# Patient Record
Sex: MALE | Race: BLACK OR AFRICAN AMERICAN | NOT HISPANIC OR LATINO | Employment: STUDENT | URBAN - METROPOLITAN AREA
[De-identification: names, ages, dates, MRNs, and addresses within clinical notes are randomized per-mention and may not be internally consistent; named-entity substitution may affect disease eponyms.]

---

## 2020-07-01 ENCOUNTER — OFFICE VISIT (OUTPATIENT)
Dept: ORTHOPEDICS | Facility: CLINIC | Age: 20
End: 2020-07-01
Payer: MEDICAID

## 2020-07-01 VITALS — HEIGHT: 73 IN | BODY MASS INDEX: 23.36 KG/M2 | WEIGHT: 176.25 LBS

## 2020-07-01 DIAGNOSIS — S93.492A SPRAIN OF ANTERIOR TALOFIBULAR LIGAMENT OF LEFT ANKLE, INITIAL ENCOUNTER: Primary | ICD-10-CM

## 2020-07-01 PROCEDURE — 99202 OFFICE O/P NEW SF 15 MIN: CPT | Mod: PBBFAC | Performed by: NURSE PRACTITIONER

## 2020-07-01 PROCEDURE — 99999 PR PBB SHADOW E&M-NEW PATIENT-LVL II: ICD-10-PCS | Mod: PBBFAC,,, | Performed by: NURSE PRACTITIONER

## 2020-07-01 PROCEDURE — 99999 PR PBB SHADOW E&M-NEW PATIENT-LVL II: CPT | Mod: PBBFAC,,, | Performed by: NURSE PRACTITIONER

## 2020-07-01 PROCEDURE — 99203 OFFICE O/P NEW LOW 30 MIN: CPT | Mod: S$PBB,,, | Performed by: NURSE PRACTITIONER

## 2020-07-01 PROCEDURE — 99203 PR OFFICE/OUTPT VISIT, NEW, LEVL III, 30-44 MIN: ICD-10-PCS | Mod: S$PBB,,, | Performed by: NURSE PRACTITIONER

## 2020-07-01 NOTE — PROGRESS NOTES
sSubjective:      Patient ID: Irving Pandey is a 19 y.o. male.    Chief Complaint: Ankle Injury (left)    In February, 2020 patient sprained his left ankle.  It seemed to get better during the quarantine, but when he returned to football, he injured it again.  He is here for evaluation and treatment.      Review of patient's allergies indicates:  No Known Allergies    History reviewed. No pertinent past medical history.  Past Surgical History:   Procedure Laterality Date    CIRCUMCISION      WISDOM TOOTH EXTRACTION       History reviewed. No pertinent family history.    No current outpatient medications on file prior to visit.     No current facility-administered medications on file prior to visit.        Social History     Social History Narrative    Mom    1 dog    Football        Review of Systems   Constitution: Negative for chills and fever.   HENT: Negative for congestion.    Eyes: Negative for discharge.   Cardiovascular: Negative for chest pain.   Respiratory: Negative for cough.    Skin: Negative for rash.   Musculoskeletal: Positive for joint pain and joint swelling.   Gastrointestinal: Negative for abdominal pain and bowel incontinence.   Genitourinary: Negative for bladder incontinence.   Neurological: Negative for headaches, numbness and paresthesias.   Psychiatric/Behavioral: The patient is not nervous/anxious.          Objective:      General    Development well-developed   Nutrition well-nourished   Body Habitus normal weight   Mood no distress    Speech normal    Tone normal        Spine    Tone tone             Vascular Exam  Dorsalis Pectus pulse Left 2+       Upper          Wrist  Stability no Right Wrist Unstable   no Left Wrist Unstable           Lower          Ankle  Tenderness   Left AITFL   Range of Motion Dorsiflexion:   Right normal    Left normal  Plantarflexion:   Right normal    Left abnormal normal Eversion:   Right normal    Left normal  Inversion:   Right normal    Left normal     Stability no anterior drawer  no hyperpronation    no anterior drawer  no hyperpronation    Muscle Strength normal right ankle strength  normal left ankle strength    Alignment Right normal   Left normal     Swelling Right swelling normal   Left swelling   mild     Foot  Alignment none                        Extremity  Gait normal   Tone Right normal Left Normal   Skin Right normal    Left normal    Sensation Right normal  Left normal   Pulse   Left 2+                      Assessment:       1. Sprain of anterior talofibular ligament of left ankle, initial encounter           Plan:       Orders written to start PT.  Placed in a lace up ankle brace.  Return for follow up in 1 month.    Follow up in about 1 month (around 8/1/2020).

## 2020-07-13 ENCOUNTER — CLINICAL SUPPORT (OUTPATIENT)
Dept: REHABILITATION | Facility: HOSPITAL | Age: 20
End: 2020-07-13
Payer: MEDICAID

## 2020-07-13 DIAGNOSIS — R53.1 DECREASED STRENGTH: ICD-10-CM

## 2020-07-13 DIAGNOSIS — M25.672 DECREASED RANGE OF MOTION OF LEFT ANKLE: ICD-10-CM

## 2020-07-13 DIAGNOSIS — S93.492A SPRAIN OF ANTERIOR TALOFIBULAR LIGAMENT OF LEFT ANKLE, INITIAL ENCOUNTER: ICD-10-CM

## 2020-07-13 PROCEDURE — 97110 THERAPEUTIC EXERCISES: CPT | Mod: PN

## 2020-07-13 PROCEDURE — 97161 PT EVAL LOW COMPLEX 20 MIN: CPT | Mod: PN

## 2020-07-13 NOTE — PLAN OF CARE
[facility]  Physical Therapy Initial Evaluation    Name: Irving Pandey  Clinic Number: [id number]    Therapy Diagnosis:   Encounter Diagnoses   Name Primary?    Sprain of anterior talofibular ligament of left ankle, initial encounter     Decreased strength     Decreased range of motion of left ankle      Physician: Shannan Salas NP    Physician Orders: PT Eval and Treat  Medical Diagnosis from Referral:   S93.492A (ICD-10-CM) - Sprain of anterior talofibular ligament of left ankle, initial encounter  Evaluation Date: 7/13/2020  Authorization Period Expiration: 12/31/2020  Plan of Care Expiration: 9/11/2020  Visit # / Visits authorized: 1/50  FOTO: 1/5  PTA Visit: 0/6    Time In: 4:55  Time Out: 5:30  Total Billable Time: 35 minutes (1 TE + 1 LCE)    Precautions: Standard and chronic low back pain, L patella tendinitis    Subjective   Date of onset: 5 months ago  History of current condition - Irving reports: injuring his left ankle about 5 months ago playing basketball spraining his ankle when planting his left foot to go up for a lay up. He was not able to walk well until about 2-3 days after (did not place any LLE weight until then), had increased swelling, no bruising, and does not remember any popping/cracking during the injury. He took a break from sports, but was training with his  running routes for football. 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 He was having some L ankle pain, but was not until he started running routes again another player when he re-sprained his left ankle. His pain is located at the barbara-lateral aspect of his left ankle.  History of L knee tendinitis that he had PT for; this is an on and off issue. Had some knee pain late last year that eventually went away.  Denies any numbness or tingling,      Medical History:   No past medical history on file.    Surgical History:   Irving Pandey  has a past surgical history that includes  Circumcision and [place] tooth extraction.    Medications:   Irving TERAN currently has no medications in their medication list.    Allergies:   Review of patient's allergies indicates:  No Known Allergies     Imaging: no ankle x-rays    Prior Therapy: yes for left knee  Social History: 13 steps, 2 story home  Occupation: sophomore at [employer]  Prior Level of Function: no left ankle pain before initial injury this year  Current Level of Function: running/jogging, hopping, cutting  Pts goals: to remove all ankle pain    Pain:  Current 1/10, worst 9/10, best 0/10   Location: left barbara-lateral ankle aspect  Description: sharp  Aggravating Factors: see limitations above in function limitation  Easing Factors: getting off his feet    Objective     Gait: WNL, slight decreased R step length  Posture: WNL  Sensation: WNL  Palpation: +TTP at L ATFL and B SI joint, midline lumbar spine  Joint mobility: decreased L TC mobility  Flexibility: decreased L calf length  Overhead Squat: pain at base of squat    A/PROM and MMT  * = left ankle pain with testing  NT = Not tested  AROM: LUMBAR   Flexion 80%   Extension 80%   Right side bending 100%   Left side bending 100%   Right rotation 100%   Left rotation 100%     Hip  Right   Left  Pain/Dysfunction with Movement    AROM PROM MMT AROM PROM MMT    Flexion 110 115 4/5 110 115 4/5    Extension 5 8 2+/5 5 6 2+/5    Abduction WFL WFL 3+/5 WFL WFL 3+/5    Adduction WFL WFL 5/5 WFL WFL 5/5    Internal rotation WFL WFL 5/5 WFL WFL 5/5    External rotation WFL WFL 4+/5 WFL WFL 4+/5       Knee  Right   Left  Pain/Dysfunction with Movement    AROM PROM MMT AROM PROM MMT    Flexion (140 deg) WFL WFL 5/5 WFL WFL 4/5    Extension (0-5 deg) WFL WFL 5/5 WFL WFL 4/5      Ankle  Right   Left  Pain/Dysfunction with Movement    AROM PROM MMT AROM PROM MMT    Great toe (45/70 deg) -- -- 5/5 -- -- 5/5    Plantarflexion (50 deg 5/5 5/5 5/5 41* 42* 4/5    Dorsiflexion (20 deg) 5/5 5/5 5/5 -5* -3* 3+/5     Inversion (20-30 deg) 5/5 5/5 5/5 22* 23* 3+/5    Eversion (5-10 deg) 5/5 5/5 5/5 23 24 3+/5      DL heel raise assessment = decreased height on LLE with decreased WB  SL heel raise assessment = not tested on L  Anterior Drawer Test = increased laxity on L  Anterior Talofibular Ligament Stress test = positive on L  Calcaneofibular ligament stress test = positive on L mildly  Interdigital Neuroma Test = negative B  Suarez test = not tested  Talar Tilit Test = not tested  Navicular drop test = not tested   (Difference of >10 mm is considered significant excessive foot pronation.)  Tinel's Sign Test (tarsal tunnel syndrome) = negative L    CMS Impairment/Limitation/Restriction for FOTO ANKLE Survey    Therapist reviewed FOTO scores for Irving Pandey on 7/13/2020.   FOTO documents entered into [employer] - see Media section.    Limitation Score: 52%  Category: Mobility  Predicted: 29%     TREATMENT   Treatment Time In: 5:20  Treatment Time Out: 5:30  Total Treatment time separate from Evaluation: 10 minutes    Irving received therapeutic exercises to develop strength, endurance, ROM, flexibility and posture for 10 minutes including:    SLR: 10x B  SL hip abd: 10x B  Calf stretch: 1x30''  Ankle circles: 5x L  Isometric ankle eversion: 3x3'' L    Home Exercises and Patient Education Provided:  Education provided:   - proper foot wear with arch support  - course of therapy, prognosis    Written Home Exercises Provided: yes.  Exercises were reviewed and Irving was able to demonstrate them prior to the end of the session.  Irving demonstrated good  understanding of the education provided.     See EMR under Media for exercises provided 7/13/2020.    Assessment   Irving TERAN is a 19 y.o. male referred to outpatient Physical Therapy with a medical diagnosis of Sprain of anterior talofibular ligament of left ankle, initial encounter presenting to PT at [facility]. Pt currently presents with L ankle  pain, decreased lumbar ROM, decreased left ankle ROM, decreased BLE and core strength, impaired balance and gait, and functional deficits with squatting, jumping/running, and playing football. Pt would benefit from skilled PT consisting of gait training, muscular skeletal stretching/strengthening, manual therapy, neuro muscular re-education, and modalities prn to address limitations and increase functional mobility.    Pt prognosis is Excellent.   Pt will benefit from skilled outpatient Physical Therapy to address the deficits stated above and in the chart below, provide pt/family education, and to maximize pt's level of independence.     Plan of care discussed with patient: Yes  Pt's spiritual, cultural and educational needs considered and patient is agreeable to the plan of care and goals as stated below:     Anticipated Barriers for therapy: chronic low back pain    Medical Necessity is demonstrated by the following  History  Co-morbidities and personal factors that may impact the plan of care Co-morbidities:   chronic low back pain, L patella tendinitis    Personal Factors:   no deficits     moderate   Examination  Body Structures and Functions, activity limitations and participation restrictions that may impact the plan of care Body Regions:   back  lower extremities  trunk    Body Systems:    gross symmetry  ROM  strength  gross coordinated movement  balance  gait  transfers  transitions  motor control  edema    Participation Restrictions:   Football, route running practice, basketball    Activity limitations:   Learning and applying knowledge  no deficits    General Tasks and Commands  no deficits    Communication  no deficits    Mobility  running/jumping, hopping    Self care  no deficits    Domestic Life  no deficits    Interactions/Relationships  no deficits    Life Areas  no deficits    Community and Social Life  no deficits         high   Clinical Presentation stable and uncomplicated low   Decision  Making/ Complexity Score: low     GOALS: Short Term Goals:  4 weeks  1. Report decreased L ankle pain </= 0/10 in standing/walking/stairs to increase tolerance for ADLs.  2. Increase L ankle AROM dorsiflexion by 3 degrees in order to walk with min to no compensation.  3. Pt will demo good sitting and standing posture for improved spine and joint alignment for improved biomechanics.  4. Pt to tolerate HEP to improve ROM and independence with ADL's.    Long Term Goals: 8 weeks  1. Report decreased L ankle pain </= 0/10 during jumping/running to increase tolerance for increased QoL and to return back to football.  2. Patient goal: to remove all ankle pain  3. Increase strength to >/= 5/5 for BLE to increase tolerance for ADL and work activities.  4. Pt will report at </= 29% impaired on ANKLE FOTO score to demo increased functional mobility.   5. Pt will be able to ambulate community distances and negotiate stairs with minimal ankle pain for increased functional mobility and QoL.    Plan   Plan of care Certification: 7/13/2020 to 9/11/2020.    Outpatient Physical Therapy 2 times weekly for 8 weeks to include the following interventions: Aquatic Therapy, Debridement (nonselective), Debridement (selective), Electrical Stimulation IFC/Russian, Gait Training, Manual Therapy, Moist Heat/ Ice, Neuromuscular Re-ed, Orthotic Management and Training, Patient Education, Self Care, Therapeutic Activites and Therapeutic Exercise.     Tien Lvoe, PT

## 2020-07-20 ENCOUNTER — CLINICAL SUPPORT (OUTPATIENT)
Dept: REHABILITATION | Facility: HOSPITAL | Age: 20
End: 2020-07-20
Payer: MEDICAID

## 2020-07-20 DIAGNOSIS — R53.1 DECREASED STRENGTH: ICD-10-CM

## 2020-07-20 DIAGNOSIS — M25.672 DECREASED RANGE OF MOTION OF LEFT ANKLE: ICD-10-CM

## 2020-07-20 PROBLEM — M25.671 DECREASED RANGE OF MOTION OF BOTH ANKLES: Status: RESOLVED | Noted: 2020-07-20 | Resolved: 2020-07-20

## 2020-07-20 PROBLEM — M25.671 DECREASED RANGE OF MOTION OF BOTH ANKLES: Status: ACTIVE | Noted: 2020-07-20

## 2020-07-20 PROCEDURE — 97110 THERAPEUTIC EXERCISES: CPT | Mod: PN

## 2020-07-20 NOTE — PROGRESS NOTES
"  Physical Therapy Treatment Note     Name: Irving Pandey  Clinic Number: [id number]    Therapy Diagnosis:   Encounter Diagnoses   Name Primary?    Decreased strength     Decreased range of motion of left ankle      Physician: Shannan Salas NP    Visit Date: 7/20/2020    Physician Orders: PT Eval and Treat  Medical Diagnosis from Referral:   S93.492A (ICD-10-CM) - Sprain of anterior talofibular ligament of left ankle, initial encounter  Evaluation Date: 7/13/2020  Authorization Period Expiration: 12/31/2020  Plan of Care Expiration: 9/11/2020  Visit # / Visits authorized: 2/50  FOTO: 2/5  PTA Visit: 0/6    Time In: 6:00 pm  Time Out: 6:45 pm  Total Billable Time: 45 minutes (3 TE)  Precautions: Standard and chronic low back pain, L patella tendinitis    Subjective     Pt reports: his ankle has been doing alright. He's been doing the exercises Murali Love, PT provided first visit.   He was compliant with home exercise program.  Response to previous treatment: Minimal change   Functional change: Minimal change     Pain: 6/10  Location: left ankle     Objective        Irving received therapeutic exercises to develop strength, endurance, ROM, flexibility and posture for 40 minutes including:     SLR - 2#     2 x 15 (L)  SL hip ABD     2 x 15 (L)  Calf stretch     3 x 30''  Ankle circles (B)    25x L  Isometric ankle inv/eversion   10x5'' L  Ankle PF - YTB    2 x 10  Ankle DF - YTB    2 x 10  SLS (L)     3 x 30"  Foot domes     5" x 10  BAPS board - L2 - counterclockwise  20x  Seated big toe FLX heel raise  20 x  Standing hip ABD - GTB (L)  2 x 15    Ice x 5 min      Home Exercises Provided and Patient Education Provided     Education provided:   - HEP    Written Home Exercises Provided: yes.  Exercises were reviewed and Irving was able to demonstrate them prior to the end of the session.  Irving demonstrated good  understanding of the education provided.     See EMR under Media for exercises provided " 7/20/2020.    Assessment   Irving TERAN is a 19 y.o. male referred to outpatient Physical Therapy with a medical diagnosis of Sprain of anterior talofibular ligament of left ankle, initial encounter presenting to PT at [facility]. Pt currently presents with L ankle pain, decreased lumbar ROM, decreased left ankle ROM, decreased BLE and core strength, impaired balance and gait, and functional deficits with squatting, jumping/running, and playing football.   Pt tolerated treatment today along with progressions and had no complaints of increased ankle pain. During standing hip ABD, pt displayed (L) ankle inversion, which improved with verbal cues. Pt displayed good single leg balance; although, he is lacking ankle eversion strength.     Irving is progressing well towards his goals.   Pt prognosis is Excellent.     Pt will continue to benefit from skilled outpatient physical therapy to address the deficits listed in the problem list box on initial evaluation, provide pt/family education and to maximize pt's level of independence in the home and community environment.     Pt's spiritual, cultural and educational needs considered and pt agreeable to plan of care and goals.     Anticipated barriers to physical therapy: chronic low back pain     Goals:   GOALS: Short Term Goals:  4 weeks  1. Report decreased L ankle pain </= 0/10 in standing/walking/stairs to increase tolerance for ADLs.  2. Increase L ankle AROM dorsiflexion by 3 degrees in order to walk with min to no compensation.  3. Pt will demo good sitting and standing posture for improved spine and joint alignment for improved biomechanics.  4. Pt to tolerate HEP to improve ROM and independence with ADL's.     Long Term Goals: 8 weeks  1. Report decreased L ankle pain </= 0/10 during jumping/running to increase tolerance for increased QoL and to return back to football.  2. Patient goal: to remove all ankle pain  3. Increase strength to >/=  5/5 for BLE to increase tolerance for ADL and work activities.  4. Pt will report at </= 29% impaired on ANKLE FOTO score to demo increased functional mobility.   5. Pt will be able to ambulate community distances and negotiate stairs with minimal ankle pain for increased functional mobility and QoL.    Plan   Plan of care Certification: 7/13/2020 to 9/11/2020.     Outpatient Physical Therapy 2 times weekly for 8 weeks to include the following interventions: Aquatic Therapy, Debridement (nonselective), Debridement (selective), Electrical Stimulation IFC/Russian, Gait Training, Manual Therapy, Moist Heat/ Ice, Neuromuscular Re-ed, Orthotic Management and Training, Patient Education, Self Care, Therapeutic Activites and Therapeutic Exercise.     Jack Valadez, PT      FEVER/chills

## 2020-07-24 ENCOUNTER — CLINICAL SUPPORT (OUTPATIENT)
Dept: REHABILITATION | Facility: HOSPITAL | Age: 20
End: 2020-07-24
Payer: MEDICAID

## 2020-07-24 DIAGNOSIS — M25.672 DECREASED RANGE OF MOTION OF LEFT ANKLE: ICD-10-CM

## 2020-07-24 DIAGNOSIS — R53.1 DECREASED STRENGTH: ICD-10-CM

## 2020-07-24 PROCEDURE — 97110 THERAPEUTIC EXERCISES: CPT | Mod: PN

## 2020-07-24 NOTE — PROGRESS NOTES
"  Physical Therapy Treatment Note     Name: Irving Pandey  Clinic Number: [id number]    Therapy Diagnosis:   Encounter Diagnoses   Name Primary?    Decreased strength     Decreased range of motion of left ankle      Physician: Shannan Salas NP    Visit Date: 7/24/2020    Physician Orders: PT Eval and Treat  Medical Diagnosis from Referral:   S93.492A (ICD-10-CM) - Sprain of anterior talofibular ligament of left ankle, initial encounter  Evaluation Date: 7/13/2020  Authorization Period Expiration: 12/31/2020  Plan of Care Expiration: 9/11/2020  Visit # / Visits authorized: 3/50  FOTO: 3/5  PTA Visit: 0/6    Time In: 4:30 pm  Time Out: 5:25 pm  Total Billable Time: 55 minutes (3 TE)  Precautions: Standard and chronic low back pain, L patella tendinitis    Subjective     Pt reports: his ankle has been doing much better. Had some anterior/medial ankle soreness after last visit which eventually went away. Pt also reports some (B) achilles pain when running routes and a "pinching feeling" in his (L) ankle during his 40 yard dash stance.    He was compliant with home exercise program.  Response to previous treatment: Minimal change   Functional change: Minimal change     Pain: 4/10  Location: left ankle     Objective     Irving received therapeutic exercises to develop strength, endurance, ROM, flexibility and posture for 30 minutes including:     SLR - 2#     2 x 15 (L)  SL hip ABD     2 x 15 (L)  Calf stretch     3 x 30''  Ankle circles (B)    25x L  Isometric ankle inv/eversion   10x5'' L  Ankle PF - RTB    2 x 10  Ankle DF - YTB    2 x 10  SLS (L) - blue foam    3 x 30"  Standing ankle DF mobs   20 x  Standing gastroc fitter stretch  3 x 30"  Standing heel raises    20 x 5" eccentric - with tennis ball squeeze  BAPS board - L2 - counterclockwise  20x - not today  Standing hip ABD - GTB (L)  2 x 15 - not today    Irving received the following manual therapy techniques: Joint mobilizations and Myofacial release were " "applied to the: (L) ankle for 15 minutes, including:  (L) ankle A/P mobs  Standing ankle DF mobilization with movement   IASTM to distal calf/achilles     Ice x 10 min      Home Exercises Provided and Patient Education Provided     Education provided:   - HEP    Written Home Exercises Provided: yes.  Exercises were reviewed and Irving was able to demonstrate them prior to the end of the session.  Irving demonstrated good  understanding of the education provided.     See EMR under Media for exercises provided 7/20/2020.    Assessment   Irving TERAN is a 19 y.o. male referred to outpatient Physical Therapy with a medical diagnosis of Sprain of anterior talofibular ligament of left ankle, initial encounter presenting to PT at [facility]. Pt currently presents with L ankle pain, decreased lumbar ROM, decreased left ankle ROM, decreased BLE and core strength, impaired balance and gait, and functional deficits with squatting, jumping/running, and playing football.   Pt tolerated treatment today along with progressions and had no complaints of increased ankle pain. Prior to tx session, patient reported he has been performing routes at practice and gets (B) achilles pain during cutting movements. Pt also reported a "pinching feeling" in his anterior (L) ankle during his 40 yard dash stance (full ankle DF with bent knee). Plan to progress treatment next session to more sports specific training.     Irving is progressing well towards his goals.   Pt prognosis is Excellent.     Pt will continue to benefit from skilled outpatient physical therapy to address the deficits listed in the problem list box on initial evaluation, provide pt/family education and to maximize pt's level of independence in the home and community environment.     Pt's spiritual, cultural and educational needs considered and pt agreeable to plan of care and goals.     Anticipated barriers to physical therapy: chronic low back pain "     Goals:   GOALS: Short Term Goals:  4 weeks  1. Report decreased L ankle pain </= 0/10 in standing/walking/stairs to increase tolerance for ADLs.  2. Increase L ankle AROM dorsiflexion by 3 degrees in order to walk with min to no compensation.  3. Pt will demo good sitting and standing posture for improved spine and joint alignment for improved biomechanics.  4. Pt to tolerate HEP to improve ROM and independence with ADL's.     Long Term Goals: 8 weeks  1. Report decreased L ankle pain </= 0/10 during jumping/running to increase tolerance for increased QoL and to return back to football.  2. Patient goal: to remove all ankle pain  3. Increase strength to >/= 5/5 for BLE to increase tolerance for ADL and work activities.  4. Pt will report at </= 29% impaired on ANKLE FOTO score to demo increased functional mobility.   5. Pt will be able to ambulate community distances and negotiate stairs with minimal ankle pain for increased functional mobility and QoL.    Plan   Plan of care Certification: 7/13/2020 to 9/11/2020.     Outpatient Physical Therapy 2 times weekly for 8 weeks to include the following interventions: Aquatic Therapy, Debridement (nonselective), Debridement (selective), Electrical Stimulation IFC/Russian, Gait Training, Manual Therapy, Moist Heat/ Ice, Neuromuscular Re-ed, Orthotic Management and Training, Patient Education, Self Care, Therapeutic Activites and Therapeutic Exercise.     Jack Valadez, PT

## 2020-08-06 ENCOUNTER — CLINICAL SUPPORT (OUTPATIENT)
Dept: REHABILITATION | Facility: HOSPITAL | Age: 20
End: 2020-08-06
Payer: MEDICAID

## 2020-08-06 DIAGNOSIS — M25.672 DECREASED RANGE OF MOTION OF LEFT ANKLE: ICD-10-CM

## 2020-08-06 DIAGNOSIS — R53.1 DECREASED STRENGTH: ICD-10-CM

## 2020-08-06 PROCEDURE — 97110 THERAPEUTIC EXERCISES: CPT | Mod: PN

## 2020-08-06 NOTE — PROGRESS NOTES
"  Physical Therapy Treatment Note     Name: Irving Pandey  Clinic Number: [id number]    Therapy Diagnosis:   Encounter Diagnoses   Name Primary?    Decreased strength     Decreased range of motion of left ankle      Physician: Shannan Salas NP    Visit Date: 8/6/2020    Physician Orders: PT Eval and Treat  Medical Diagnosis from Referral:   S93.492A (ICD-10-CM) - Sprain of anterior talofibular ligament of left ankle, initial encounter  Evaluation Date: 7/13/2020  Authorization Period Expiration: 12/31/2020  Plan of Care Expiration: 9/11/2020  Visit # / Visits authorized: 4/50  FOTO: 4/5  PTA Visit: 0/6    Time In: 400 pm  Time Out: 445 pm  Total Billable Time: 45 minutes (3 TE - Medicaid)  Precautions: Standard and chronic low back pain, L patella tendinitis    Subjective     Pt reports: his ankle has been doing much better overall. Has some anterior/medial ankle soreness and achilles pain when running routes/cutting. Minimal when sprinting/running.  He was compliant with home exercise program.  Response to previous treatment: Minimal change   Functional change: Minimal change     Pain: 2-3/10  Location: left ankle     Objective     Irving received therapeutic exercises to develop strength, endurance, ROM, flexibility and posture for 35 minutes including:  Ankle PF - BTB    3 x 10  Ankle DF - GTB    3 x 10  Lateral walks      with GTB around ankles holding onto 2 10# wts; 40ft 1 lap there and back   Heel raise mini jumps vs wall   3x fatigue; mini jumps   SL balance fwd reaches   Foot on green balance disc; reaching out holding onto 10# wt   Lunge rotation     Holding onto 10# wt, 40ft     Star balance      All directions x3, holding onto 10# wt, SLS on green oval foam   Fwd reached on balance pad   2x10, holding onto 10# wt  Lateral 4 step high knee   With "hold to chest pass" 2x fatigue   Fwd Running/high knee vs resistance band x3     Fitter stretch     3x30"    Irving received the following manual therapy " techniques: Joint mobilizations and Myofacial release were applied to the: (L) ankle for 10 minutes, including:  Astym to the G/S complex, through Achilles  Gr V distraction DF x5      Home Exercises Provided and Patient Education Provided     Education provided:   - HEP    Written Home Exercises Provided: yes.  Exercises were reviewed and Irving was able to demonstrate them prior to the end of the session.  Irving demonstrated good  understanding of the education provided.     See EMR under Media for exercises provided 7/20/2020.    Assessment   Irving TERAN is a 19 y.o. male referred to outpatient Physical Therapy with a medical diagnosis of Sprain of anterior talofibular ligament of left ankle, initial encounter presenting to PT at [facility]. Pt currently presents with L ankle pain, decreased lumbar ROM, decreased left ankle ROM, decreased BLE and core strength, impaired balance and gait, and functional deficits with squatting, jumping/running, and playing football.   Pt tolerated treatment well today with return to sport activities with focus on Achilles loading as well as 3 dimensional movements to focus on stabilization through dynamic situations.    Good relief post MT (Astym + GrV distraction DF with cavitation noted).   Plan to progress treatment next session to more sports specific training.     Irving is progressing well towards his goals.   Pt prognosis is Excellent.     Pt will continue to benefit from skilled outpatient physical therapy to address the deficits listed in the problem list box on initial evaluation, provide pt/family education and to maximize pt's level of independence in the home and community environment.     Pt's spiritual, cultural and educational needs considered and pt agreeable to plan of care and goals.     Anticipated barriers to physical therapy: chronic low back pain     Goals:   GOALS: Short Term Goals:  4 weeks  1. Report decreased L ankle pain </=  0/10 in standing/walking/stairs to increase tolerance for ADLs.  2. Increase L ankle AROM dorsiflexion by 3 degrees in order to walk with min to no compensation.  3. Pt will demo good sitting and standing posture for improved spine and joint alignment for improved biomechanics.  4. Pt to tolerate HEP to improve ROM and independence with ADL's.     Long Term Goals: 8 weeks  1. Report decreased L ankle pain </= 0/10 during jumping/running to increase tolerance for increased QoL and to return back to football.  2. Patient goal: to remove all ankle pain  3. Increase strength to >/= 5/5 for BLE to increase tolerance for ADL and work activities.  4. Pt will report at </= 29% impaired on ANKLE FOTO score to demo increased functional mobility.   5. Pt will be able to ambulate community distances and negotiate stairs with minimal ankle pain for increased functional mobility and QoL.    Plan   Plan of care Certification: 7/13/2020 to 9/11/2020.     Outpatient Physical Therapy 2 times weekly for 8 weeks to include the following interventions: Aquatic Therapy, Debridement (nonselective), Debridement (selective), Electrical Stimulation IFC/Russian, Gait Training, Manual Therapy, Moist Heat/ Ice, Neuromuscular Re-ed, Orthotic Management and Training, Patient Education, Self Care, Therapeutic Activites and Therapeutic Exercise.     Twan Lynne, PT

## 2020-08-12 ENCOUNTER — OFFICE VISIT (OUTPATIENT)
Dept: ORTHOPEDICS | Facility: CLINIC | Age: 20
End: 2020-08-12
Payer: MEDICAID

## 2020-08-12 VITALS — WEIGHT: 177.38 LBS | HEIGHT: 73 IN | BODY MASS INDEX: 23.51 KG/M2

## 2020-08-12 DIAGNOSIS — S93.492D SPRAIN OF ANTERIOR TALOFIBULAR LIGAMENT OF LEFT ANKLE, SUBSEQUENT ENCOUNTER: Primary | ICD-10-CM

## 2020-08-12 PROCEDURE — 99999 PR PBB SHADOW E&M-EST. PATIENT-LVL II: CPT | Mod: PBBFAC,,, | Performed by: NURSE PRACTITIONER

## 2020-08-12 PROCEDURE — 99213 OFFICE O/P EST LOW 20 MIN: CPT | Mod: S$PBB,,, | Performed by: NURSE PRACTITIONER

## 2020-08-12 PROCEDURE — 99212 OFFICE O/P EST SF 10 MIN: CPT | Mod: PBBFAC | Performed by: NURSE PRACTITIONER

## 2020-08-12 PROCEDURE — 99213 PR OFFICE/OUTPT VISIT, EST, LEVL III, 20-29 MIN: ICD-10-PCS | Mod: S$PBB,,, | Performed by: NURSE PRACTITIONER

## 2020-08-12 PROCEDURE — 99999 PR PBB SHADOW E&M-EST. PATIENT-LVL II: ICD-10-PCS | Mod: PBBFAC,,, | Performed by: NURSE PRACTITIONER

## 2020-08-12 NOTE — PROGRESS NOTES
sSubjective:      Patient ID: Irving Pandey is a 19 y.o. male.    Chief Complaint: Ankle Pain (f/u)    In February, 2020 patient sprained his left ankle.  It seemed to get better during the quarantine, but when he returned to football, he injured it again.  He was treated with a brace and PT and now has his strength back.    Ankle Pain   Pertinent negatives include no fever or numbness.       Review of patient's allergies indicates:  No Known Allergies    History reviewed. No pertinent past medical history.  Past Surgical History:   Procedure Laterality Date    CIRCUMCISION      WISDOM TOOTH EXTRACTION       History reviewed. No pertinent family history.    No current outpatient medications on file prior to visit.     No current facility-administered medications on file prior to visit.        Social History     Social History Narrative    Mom    1 dog    Football        Review of Systems   Constitution: Negative for chills and fever.   HENT: Negative for congestion.    Eyes: Negative for discharge.   Cardiovascular: Negative for chest pain.   Respiratory: Negative for cough.    Skin: Negative for rash.   Musculoskeletal: Negative for joint pain and joint swelling.   Gastrointestinal: Negative for abdominal pain and bowel incontinence.   Genitourinary: Negative for bladder incontinence.   Neurological: Negative for headaches, numbness and paresthesias.   Psychiatric/Behavioral: The patient is not nervous/anxious.          Objective:      General    Development well-developed   Nutrition well-nourished   Body Habitus normal weight   Mood no distress    Speech normal    Tone normal        Spine    Tone tone             Vascular Exam  Dorsalis Pectus pulse Left 2+       Upper          Wrist  Stability no Right Wrist Unstable   no Left Wrist Unstable           Lower          Ankle  Tenderness   Left none   Range of Motion Dorsiflexion:   Right normal    Left normal  Plantarflexion:   Right normal    Left normal   Eversion:   Right normal    Left normal  Inversion:   Right normal    Left normal    Stability no anterior drawer  no hyperpronation    no anterior drawer  no hyperpronation    Muscle Strength normal right ankle strength  normal left ankle strength    Alignment Right normal   Left normal     Swelling Right swelling normal   Left no swelling       Foot  Alignment none                        Extremity  Gait normal   Tone Right normal Left Normal   Skin Right normal    Left normal    Sensation Right normal  Left normal   Pulse   Left 2+                      Assessment:       1. Sprain of anterior talofibular ligament of left ankle, subsequent encounter           Plan:       Complete PT.  Use brace for sports.  Patient may continue or resume activities as tolerated.  Return to clinic prn.    Follow up if symptoms worsen or fail to improve.

## 2020-09-03 ENCOUNTER — TELEPHONE (OUTPATIENT)
Dept: REHABILITATION | Facility: HOSPITAL | Age: 20
End: 2020-09-03

## 2021-12-23 ENCOUNTER — HOSPITAL ENCOUNTER (OUTPATIENT)
Dept: RADIOLOGY | Facility: HOSPITAL | Age: 21
Discharge: HOME | End: 2021-12-23
Attending: NURSE PRACTITIONER
Payer: MEDICAID

## 2021-12-23 ENCOUNTER — OFFICE VISIT (OUTPATIENT)
Dept: ORTHOPEDICS | Facility: CLINIC | Age: 21
End: 2021-12-23
Payer: MEDICAID

## 2021-12-23 VITALS — BODY MASS INDEX: 23.56 KG/M2 | WEIGHT: 177.81 LBS | HEIGHT: 73 IN

## 2021-12-23 DIAGNOSIS — M25.851 FEMOROACETABULAR IMPINGEMENT OF BOTH HIPS: ICD-10-CM

## 2021-12-23 DIAGNOSIS — M25.552 LEFT HIP PAIN: ICD-10-CM

## 2021-12-23 DIAGNOSIS — S93.492D SPRAIN OF ANTERIOR TALOFIBULAR LIGAMENT OF LEFT ANKLE, SUBSEQUENT ENCOUNTER: ICD-10-CM

## 2021-12-23 DIAGNOSIS — M25.852 FEMOROACETABULAR IMPINGEMENT OF BOTH HIPS: ICD-10-CM

## 2021-12-23 DIAGNOSIS — S93.492D SPRAIN OF ANTERIOR TALOFIBULAR LIGAMENT OF LEFT ANKLE, SUBSEQUENT ENCOUNTER: Primary | ICD-10-CM

## 2021-12-23 PROCEDURE — 73630 X-RAY EXAM OF FOOT: CPT | Mod: 26,LT,, | Performed by: RADIOLOGY

## 2021-12-23 PROCEDURE — 73521 X-RAY EXAM HIPS BI 2 VIEWS: CPT | Mod: TC

## 2021-12-23 PROCEDURE — 3008F PR BODY MASS INDEX (BMI) DOCUMENTED: ICD-10-PCS | Mod: CPTII,,, | Performed by: NURSE PRACTITIONER

## 2021-12-23 PROCEDURE — 73630 XR FOOT COMPLETE 3 VIEW LEFT: ICD-10-PCS | Mod: 26,LT,, | Performed by: RADIOLOGY

## 2021-12-23 PROCEDURE — 1159F PR MEDICATION LIST DOCUMENTED IN MEDICAL RECORD: ICD-10-PCS | Mod: CPTII,,, | Performed by: NURSE PRACTITIONER

## 2021-12-23 PROCEDURE — 1159F MED LIST DOCD IN RCRD: CPT | Mod: CPTII,,, | Performed by: NURSE PRACTITIONER

## 2021-12-23 PROCEDURE — 99213 PR OFFICE/OUTPT VISIT, EST, LEVL III, 20-29 MIN: ICD-10-PCS | Mod: S$PBB,,, | Performed by: NURSE PRACTITIONER

## 2021-12-23 PROCEDURE — 3008F BODY MASS INDEX DOCD: CPT | Mod: CPTII,,, | Performed by: NURSE PRACTITIONER

## 2021-12-23 PROCEDURE — 73630 X-RAY EXAM OF FOOT: CPT | Mod: TC,LT

## 2021-12-23 PROCEDURE — 99213 OFFICE O/P EST LOW 20 MIN: CPT | Mod: S$PBB,,, | Performed by: NURSE PRACTITIONER

## 2021-12-23 PROCEDURE — 99999 PR PBB SHADOW E&M-EST. PATIENT-LVL III: CPT | Mod: PBBFAC,,, | Performed by: NURSE PRACTITIONER

## 2021-12-23 PROCEDURE — 99213 OFFICE O/P EST LOW 20 MIN: CPT | Mod: PBBFAC | Performed by: NURSE PRACTITIONER

## 2021-12-23 PROCEDURE — 99999 PR PBB SHADOW E&M-EST. PATIENT-LVL III: ICD-10-PCS | Mod: PBBFAC,,, | Performed by: NURSE PRACTITIONER

## 2021-12-23 PROCEDURE — 73521 X-RAY EXAM HIPS BI 2 VIEWS: CPT | Mod: 26,,, | Performed by: RADIOLOGY

## 2021-12-23 PROCEDURE — 73521 XR HIPS BILATERAL 2 VIEW INCL AP PELVIS: ICD-10-PCS | Mod: 26,,, | Performed by: RADIOLOGY

## 2022-01-03 ENCOUNTER — CLINICAL SUPPORT (OUTPATIENT)
Dept: REHABILITATION | Facility: HOSPITAL | Age: 22
End: 2022-01-03
Payer: MEDICAID

## 2022-01-03 DIAGNOSIS — R29.898 IMPAIRED STRENGTH OF HIP MUSCLES: ICD-10-CM

## 2022-01-03 DIAGNOSIS — M25.652 DECREASED RANGE OF LEFT HIP MOVEMENT: ICD-10-CM

## 2022-01-03 PROCEDURE — 97161 PT EVAL LOW COMPLEX 20 MIN: CPT

## 2022-01-03 PROCEDURE — 97110 THERAPEUTIC EXERCISES: CPT

## 2022-01-04 PROBLEM — R29.898 IMPAIRED STRENGTH OF HIP MUSCLES: Status: ACTIVE | Noted: 2022-01-04

## 2022-01-04 PROBLEM — M25.659 DECREASED RANGE OF MOTION OF HIP: Status: ACTIVE | Noted: 2022-01-04

## 2022-01-04 NOTE — PLAN OF CARE
"[facility]  [place] [id number]  Physical Therapy Initial Evaluation    Name: Irving Pandey  Clinic Number: [id number]    Therapy Diagnosis:   Encounter Diagnoses   Name Primary?    Decreased range of left hip movement     Impaired strength of hip muscles      Physician: Shannan Salas NP    Physician Orders: PT Eval and Treat   Medical Diagnosis from Referral: L Ankle Sprain and L Hip Pain   Evaluation Date: 1/3/2022  Authorization Period Expiration: 12/23/2022  Plan of Care Expiration: 2/15/2022  Progress Note Due: 2/15/2022  Visit # / Visits authorized: 1/ PEND   FOTO: Issued Visit # 1       Time In: 2PM  Time Out: 3PM  Total Billable Time: 60 minutes    Precautions: Standard    Subjective   Date of onset: Over a year ago   History of current condition - Irving reports: spraining his ankle almost 1.5 years prior. He states that he was in therapy for this condition and it was getting better. He self discharged from therapy but still complains of some pain and stability issues related to this ankle. Particularly while walking on unstable surfaces, balancing on one limb and during rain. During this time, his hip began to hurt while running about 3 weeks ago while at college. He feels a pinch in the front of his hip when he walks, descends stairs, and changes directions quickly. He denies catching, locking, or radiating pian. He describes his pain with the "C" sign about his acetabular rim. He wishes to get back to a functional level where he is able to perform ADL's without pain and/or discomfort. He states that he has some back pain but this is typically associated with lifting weights.    No past medical history on file.  Irving Pandey  has a past surgical history that includes Circumcision and [place] tooth extraction.    Irving TERAN currently has no medications in their medication list.    Review of patient's allergies indicates:  No Known Allergies     Pain:  Current 0/10, worst 5/10, best " "0/10   Location: left ankles and hip   Description: Sharp  Aggravating Factors: Bending, Walking, Extension and Flexing  Easing Factors: pain medication and rest    Prior Therapy: Yes for similar ankle sprain   Social History:  lives with their family  Occupation: College Student   Prior Level of Function: Fully functioning college student   Current Level of Function: Unable to perform ADL's without hip/ankle pain    Pts goals: "I want to get back to feeling normal again"     Objective     Observation: Patient arrives to clinic in no apparent distress    Range of Motion (Passive):   Hip Right Left   Flexion 120 90*   Extension 10 10*   Abduction NT NT   Adduction NT NT   External Rotation 45 45   Internal Rotation 15 5*     Strength: Hip   Right Left Comment   Flexion 5/5 5/5    Extension 3+/5 3+/5    Abduction: 3+/5 3+/5    Adduction NT    NT       External Rotation 3+/5    3+/5       Internal Rotation 4/5    4/5         Special Tests: +FADIR, -SCOUR, +SL squat    Palpation: no TTP found within the ankle or hip complex    Functional Tests:   Single leg squat: R 5/5 ; L 0/5  Single leg calf raise: R 20/20 ; L 20/20      Ankle Active Range of Motion:   Ankle Right Left   DF 5 0   Plantarflexion 45 45   Inversion 30 30   Eversion 20 20   1st MTP Extension  NT NT      Ankle Passive Range of Motion:   Ankle Right Left   DF (knee extended) 5 0   DF (knee bent) 5 0   Plantarflexion 45 45   Inversion 30 30   Eversion 20 20   1st MTP Extension  NT NT       Lower Extremity Strength   Right  Left    Dorsiflexion 4+/5 4+/5   Plantarflexion (standing) 5/5 5/5   Inversion 5/5 5/5   Eversion 5/5 5/5   Hip extension 3+/5 3+/5   PGM 3+/5 3+/5         Joint Mobility: Patient demonstrated decreased accessory glide into DF at the TC joint       CMS Impairment/Limitation/Restriction for FOTO  Survey    Therapist reviewed FOTO scores for Irving Pandey on 1/3/2022.   FOTO documents entered into EPIC - see Media " section.    Limitation Score: see media section%  Category: Mobility       TREATMENT   Treatment Time In: 2:30PM  Treatment Time Out: 3PM  Total Treatment time separate from Evaluation time:30    Irving received therapeutic exercises to develop strength, endurance, ROM and flexibility for 30 minutes including:  Hip Lateral distraction via mulligan belt Gd 3  Hip Flexor Stretch 4x30s  Glut Bridge Marches 2x10  TC joint mobilization Gd 5  Soleus Stretch 4x30s     Education     Home Exercises and Patient Education Provided    Education provided re:   -Importance of therapy   -Plan for rehab   -Importance of motion and strength   - progress towards goals   - role of therapy in multi - disciplinary team, goals for therapy  No spiritual or educational barriers to learning provided    Written Home Exercises Provided: YES.  Exercises were reviewed and Irving was able to demonstrate them prior to the end of the session.   Pt received a written copy of exercises to perform at home. Irving demonstrated good  understanding of the education provided.     Assessment   Irving TERAN is a 21 y.o. male referred to outpatient Physical Therapy with a medical diagnosis of L ankle pain and L hip pain. Pt presents with decreased ROM of the L hip into flexion, extension and IR, decreased hip extensor and abductor strength. He also presents with decreased L ankle DF secondary to a joint restriction at the TC joint as well as soleus adaptive shortening. These impairments are consistent with the referring diagnosis of DILLAN and a chronic ankle sprain. These impairments prevent the patient from performing ADL's such as balancing on unstable surfaces, descending stairs, and lifting objects from the ground.    Pt prognosis is Excellent.   Pt will benefit from skilled outpatient Physical Therapy to address the deficits stated above and in the chart below, provide pt/family education, and to maximize pt's level of independence.     Plan of care  discussed with patient: Yes  Pt's spiritual, cultural and educational needs considered and pt agreeable to plan of care and goals as stated below:     Anticipated Barriers for therapy: COVID-19, patient returns to school in late January     Medical Necessity is demonstrated by the following  History  Co-morbidities and personal factors that may impact the plan of care Co-morbidities:   none    Personal Factors:   no deficits     low   Examination  Body Structures and Functions, activity limitations and participation restrictions that may impact the plan of care Body Regions:   lower extremities    Body Systems:    gross symmetry  ROM  strength  gross coordinated movement  balance  transfers  transitions  motor control  motor learning    Participation Restrictions:   Patient is unable to ambulate for prolonged periods, descend stairs, and work in a standing position     Activity limitations:   Mobility  lifting and carrying objects  walking  driving (bike, car, motorcycle)    Self care  looking after one's health    Domestic Life  assisting others    Community and Social Life  no deficits         low   Clinical Presentation stable and uncomplicated low   Decision Making/ Complexity Score: low     Goals   ST. Patient will demonstrate a FOTO score improvement of at least 9 points prior to Week 4.  2. Patient will demonstrate HEP independence by the end of Week 4.  3. Patient will demonstrate a single limb balance test on an unable surface for greater than 30s prior to Week 4  LTG: 10+  1. Patient will demonstrate a FOTO score improvement of at least 18 points prior to discharge  2. Patient will perform ADL's for at least two days prior to discharge.     Plan   Certification Period: 1/3/2022 to 2/15/2022.    Outpatient Physical Therapy 2 times weekly for 6 weeks to include the following interventions: patient education, Electrical Stimulation NMES, Manual Therapy, Moist Heat/ Ice, Neuromuscular Re-ed, Patient  Education, Self Care, Therapeutic Activities and Therapeutic Exercise.     Jamin Pinedo, PT

## 2022-01-07 ENCOUNTER — CLINICAL SUPPORT (OUTPATIENT)
Dept: REHABILITATION | Facility: HOSPITAL | Age: 22
End: 2022-01-07
Payer: MEDICAID

## 2022-01-07 DIAGNOSIS — R29.898 IMPAIRED STRENGTH OF HIP MUSCLES: ICD-10-CM

## 2022-01-07 DIAGNOSIS — M25.652 DECREASED RANGE OF LEFT HIP MOVEMENT: ICD-10-CM

## 2022-01-07 PROCEDURE — 97110 THERAPEUTIC EXERCISES: CPT

## 2022-01-10 ENCOUNTER — CLINICAL SUPPORT (OUTPATIENT)
Dept: REHABILITATION | Facility: HOSPITAL | Age: 22
End: 2022-01-10
Payer: MEDICAID

## 2022-01-10 DIAGNOSIS — R29.898 IMPAIRED STRENGTH OF HIP MUSCLES: ICD-10-CM

## 2022-01-10 DIAGNOSIS — M25.652 DECREASED RANGE OF LEFT HIP MOVEMENT: ICD-10-CM

## 2022-01-10 PROCEDURE — 97110 THERAPEUTIC EXERCISES: CPT

## 2022-01-11 NOTE — PROGRESS NOTES
"  Physical Therapy Daily Treatment Note   Dana 1      Visit Date: 1/7/2022    Name: Irving Pandey  Clinic Number: [id number]    Therapy Diagnosis:   Encounter Diagnoses   Name Primary?    Decreased range of left hip movement     Impaired strength of hip muscles      Physician: Shannan Salas NP    Physician Orders: PT Eval and Treat   Medical Diagnosis from Referral: L Ankle Sprain and L Hip Pain   Evaluation Date: 1/3/2022  Authorization Period Expiration: 12/23/2022  Plan of Care Expiration: 2/15/2022  Progress Note Due: 2/15/2022  Visit # / Visits authorized: 2/ PEND   FOTO: Issued Visit # 1        Time In: 2PM  Time Out: 3PM  Total Billable Time: 30 minutes     Precautions: Standard      Subjective      Pt reports: "I was just running on it so it's a little aggravated"     he was compliant with home exercise program given last session.   Response to previous treatment:Improved hip motion into extension   Functional change: None at this time     Pain: 3/10  Location: left ankle and hip     Objective     Measurements taken:      Irving received therapeutic exercises to develop strength, endurance, ROM, flexibility and posture for 60 minutes including:  Prone Hip Extension Mobs Gd 3/4  Glut Bridge SL Iso Holds 10x10s ea, VC for ipsilateral hip rotation during SL movements  Lateral Banded Shuffles x5 laps   Lateral/Anterior Step-downs 3x12 ea 6in   Ankle TC joint mobilization Gd 5  Soleus Stretch 4x30s  TC joint self mob 3x15, 5s  Hip Flexor Stretch 4x30s    All Units billed at Therapeutic Exercise per medicaid guidelines   Home Exercises Provided and Patient Education Provided     Education provided:   - Importance of glut and core strength     Written Home Exercises Provided: Patient instructed to cont prior HEP.  Exercises were reviewed and Irving was able to demonstrate them prior to the end of the session.  Irving demonstrated good  understanding of the education provided.     See EMR under Patient " Instructions for exercises provided prior visit.      Assessment     Patient demonstrated improved hip mobility on this day. His anterior hip issues were still irritated as he had just run prior to therapy, but this was alleviated with stretching and hip mobilizations. This was followed with glut strengthening interventions followed by functional strength interventions afterwards. Plan to progress per his tolerance.     Irving Is progressing well towards his goals.   Pt prognosis is Excellent.     Pt will continue to benefit from skilled outpatient physical therapy to address the deficits listed in the problem list box on initial evaluation, provide pt/family education and to maximize pt's level of independence in the home and community environment.     Pt's spiritual, cultural and educational needs considered and pt agreeable to plan of care and goals.    Anticipated barriers to physical therapy: none    Goals:     ST. Patient will demonstrate a FOTO score improvement of at least 9 points prior to Week 4.  2. Patient will demonstrate HEP independence by the end of Week 4.  3. Patient will demonstrate a single limb balance test on an unable surface for greater than 30s prior to Week 4  LTG: 10+  1. Patient will demonstrate a FOTO score improvement of at least 18 points prior to discharge  2. Patient will perform ADL's for at least two days prior to discharge.       Plan     Continue with established Plan of Care towards PT goals with focus on decreasing pain, increasing ROM, strength, neuromuscular control and functional status       Jamin Pinedo, PT

## 2022-01-13 NOTE — PROGRESS NOTES
"  Physical Therapy Daily Treatment Note   Dana 1      Visit Date: 1/10/2022    Name: Irving Pandey  Clinic Number: [id number]    Therapy Diagnosis:   Encounter Diagnoses   Name Primary?    Decreased range of left hip movement     Impaired strength of hip muscles      Physician: Shannan Salas NP    Physician Orders: PT Eval and Treat   Medical Diagnosis from Referral: L Ankle Sprain and L Hip Pain   Evaluation Date: 1/3/2022  Authorization Period Expiration: 12/23/2022  Plan of Care Expiration: 2/15/2022  Progress Note Due: 2/15/2022  Visit # / Visits authorized: 3/ PEND   FOTO: Issued Visit # 1        Time In: 1PM  Time Out: 2PM  Total Billable Time: 30 minutes     Precautions: Standard      Subjective      Pt reports: "It feels pretty good today"     he was compliant with home exercise program given last session.   Response to previous treatment:Improved hip motion into extension   Functional change: None at this time     Pain: 3/10  Location: left ankle and hip     Objective     Measurements taken:      Irving received therapeutic exercises to develop strength, endurance, ROM, flexibility and posture for 60 minutes including:  Prone Hip Extension Mobs Gd 3/4  Glut Bridge SL Iso Holds 10x10s ea, VC for ipsilateral hip rotation during SL movements  Lateral Banded Shuffles x5 laps   Lateral/Anterior Step-downs 3x12 ea 6in   Ankle TC joint mobilization Gd 5  Soleus Stretch 4x30s  TC joint self mob 3x15, 5s  Hip Flexor Stretch 4x30s  DL Squat 3x10 VC for posterior weight shift  Sneaky Lunge 2x10 VC for increased DF   6in Lateral Step-down 3x12    All Units billed at Therapeutic Exercise per medicaid guidelines   Home Exercises Provided and Patient Education Provided     Education provided:   - Importance of glut and core strength     Written Home Exercises Provided: Patient instructed to cont prior HEP.  Exercises were reviewed and Irving was able to demonstrate them prior to the end of the session.  Irving " demonstrated good  understanding of the education provided.     See EMR under Patient Instructions for exercises provided prior visit.      Assessment     Patients symptoms were improved on this date secondary to lack of running. His hip extension and ankle DF also have improved which has led to increased functionality. All motion improvements were followed by strengthening within the new range. Patient will require additional therapy to continue to progress and maintain improvements.     Irving Is progressing well towards his goals.   Pt prognosis is Excellent.     Pt will continue to benefit from skilled outpatient physical therapy to address the deficits listed in the problem list box on initial evaluation, provide pt/family education and to maximize pt's level of independence in the home and community environment.     Pt's spiritual, cultural and educational needs considered and pt agreeable to plan of care and goals.    Anticipated barriers to physical therapy: none    Goals:     ST. Patient will demonstrate a FOTO score improvement of at least 9 points prior to Week 4.  2. Patient will demonstrate HEP independence by the end of Week 4.  3. Patient will demonstrate a single limb balance test on an unable surface for greater than 30s prior to Week 4  LTG: 10+  1. Patient will demonstrate a FOTO score improvement of at least 18 points prior to discharge  2. Patient will perform ADL's for at least two days prior to discharge.       Plan     Continue with established Plan of Care towards PT goals with focus on decreasing pain, increasing ROM, strength, neuromuscular control and functional status       Jamin Pinedo, PT

## 2022-01-14 ENCOUNTER — CLINICAL SUPPORT (OUTPATIENT)
Dept: REHABILITATION | Facility: HOSPITAL | Age: 22
End: 2022-01-14
Payer: MEDICAID

## 2022-01-14 DIAGNOSIS — R29.898 IMPAIRED STRENGTH OF HIP MUSCLES: ICD-10-CM

## 2022-01-14 DIAGNOSIS — M25.651 DECREASED RANGE OF RIGHT HIP MOVEMENT: ICD-10-CM

## 2022-01-14 PROCEDURE — 97110 THERAPEUTIC EXERCISES: CPT

## 2022-01-18 ENCOUNTER — CLINICAL SUPPORT (OUTPATIENT)
Dept: REHABILITATION | Facility: HOSPITAL | Age: 22
End: 2022-01-18
Payer: MEDICAID

## 2022-01-18 DIAGNOSIS — R29.898 IMPAIRED STRENGTH OF HIP MUSCLES: ICD-10-CM

## 2022-01-18 DIAGNOSIS — M25.651 DECREASED RANGE OF RIGHT HIP MOVEMENT: ICD-10-CM

## 2022-01-18 PROCEDURE — 97110 THERAPEUTIC EXERCISES: CPT | Mod: CQ

## 2022-01-18 NOTE — PROGRESS NOTES
"  Physical Therapy Daily Treatment Note   Dana 1      Visit Date: 1/18/2022    Name: Irving Pandey  Clinic Number: [id number]    Therapy Diagnosis:   Encounter Diagnoses   Name Primary?    Decreased range of right hip movement     Impaired strength of hip muscles      Physician: Shannan Salas NP    Physician Orders: PT Eval and Treat   Medical Diagnosis from Referral: L Ankle Sprain and L Hip Pain   Evaluation Date: 1/3/2022  Authorization Period Expiration: 12/23/2022  Plan of Care Expiration: 2/15/2022  Progress Note Due: 2/15/2022  Visit # / Visits authorized: 4/ PEND   FOTO: Issued Visit # 1        Time In: 1300  Time Out: 1400  Total Billable Time: 30 minutes     Precautions: Standard      Subjective      Pt reports: no ankle or hip pain at present time. Occasional "pinching in my hip" with increased flexion   he was compliant with home exercise program, and working out on own  Response to previous treatment:Improved hip motion into extension   Functional change: no ankle issues     Pain: 0/10  Location: left ankle and hip     Objective     Measurements taken:      Irving received therapeutic exercises to develop strength, endurance, ROM, flexibility and posture for 60 minutes including:  B hip flexor stretch EOP 3x/30"  Elliptical 10' alternating 1' fwd/bwd   B High kneeling Hip Extension 3x/30"   3D hamstring/gluts SB 30x ea   B Glut Bridge SL Iso Holds 10x10s ea, VC for ipsilateral hip rotation during SL movements  LLLD R hip distraction, hip mobs   Lateral Banded Shuffles x5 laps   Lateral/Anterior Step-downs 3x12 ea 6"      Ankle TC joint mobilization Gd 5  Soleus Stretch 4x30s  TC joint self mob 3x15, 5s  DL Squat 3x10 VC for posterior weight shift  Sneaky Lunge 2x10 VC for increased DF   6in Lateral Step-down 3x12    All Units billed at Therapeutic Exercise per medicaid guidelines   Home Exercises Provided and Patient Education Provided     Education provided:   - Importance of glut and core " strength     Written Home Exercises Provided: Patient instructed to cont prior HEP.  Exercises were reviewed and Irving was able to demonstrate them prior to the end of the session.  Irving demonstrated good  understanding of the education provided.     See EMR under Patient Instructions for exercises provided prior visit.      Assessment   Pt tolerating tx well with min glut/hamstring soreness/fatigue. Continue on gut/core strengthening and hip ext, ankle DF per supervising PT. VC/TC for correcting form/technique with therex.   Irving Is progressing well towards his goals.   Pt prognosis is Excellent.     Pt will continue to benefit from skilled outpatient physical therapy to address the deficits listed in the problem list box on initial evaluation, provide pt/family education and to maximize pt's level of independence in the home and community environment.     Pt's spiritual, cultural and educational needs considered and pt agreeable to plan of care and goals.    Anticipated barriers to physical therapy: none    Goals:     ST. Patient will demonstrate a FOTO score improvement of at least 9 points prior to Week 4.  2. Patient will demonstrate HEP independence by the end of Week 4.  3. Patient will demonstrate a single limb balance test on an unable surface for greater than 30s prior to Week 4  LTG: 10+  1. Patient will demonstrate a FOTO score improvement of at least 18 points prior to discharge  2. Patient will perform ADL's for at least two days prior to discharge.       Plan     Continue with established Plan of Care towards PT goals with focus on decreasing pain, increasing ROM, strength, neuromuscular control and functional status       Iftikhar Dennison, PTA, STS

## 2022-01-21 ENCOUNTER — CLINICAL SUPPORT (OUTPATIENT)
Dept: REHABILITATION | Facility: HOSPITAL | Age: 22
End: 2022-01-21
Payer: MEDICAID

## 2022-01-21 DIAGNOSIS — M25.651 DECREASED RANGE OF RIGHT HIP MOVEMENT: ICD-10-CM

## 2022-01-21 DIAGNOSIS — R29.898 IMPAIRED STRENGTH OF HIP MUSCLES: ICD-10-CM

## 2022-01-21 PROCEDURE — 97110 THERAPEUTIC EXERCISES: CPT

## 2022-01-28 NOTE — PROGRESS NOTES
"  Physical Therapy Daily Treatment Note   D[facility] 1      Visit Date: 1/14/2022    Name: Irving Pandey  Clinic Number: [id number]    Therapy Diagnosis:   Encounter Diagnoses   Name Primary?    Decreased range of right hip movement     Impaired strength of hip muscles      Physician: Shannan Salas NP    Physician Orders: PT Eval and Treat   Medical Diagnosis from Referral: L Ankle Sprain and L Hip Pain   Evaluation Date: 1/3/2022  Authorization Period Expiration: 12/23/2022  Plan of Care Expiration: 2/15/2022  Progress Note Due: 2/15/2022  Visit # / Visits authorized: 4/ PEND   FOTO: Issued Visit # 1        Time In: 1PM  Time Out: 2PM  Total Billable Time: 30 minutes     Precautions: Standard      Subjective      Pt reports: "I feel really good"     he was compliant with home exercise program given last session.   Response to previous treatment:Improved hip motion into extension   Functional change: None at this time     Pain: 3/10  Location: left ankle and hip     Objective     Measurements taken:      Irving received therapeutic exercises to develop strength, endurance, ROM, flexibility and posture for 60 minutes including:  Glut Bridge SL Iso Holds 10x10s ea, VC for ipsilateral hip rotation during SL movements  Lateral/Anterior Step-downs 3x12 ea 6in   Ankle TC joint mobilization Gd 5  Soleus Stretch 4x30s  TC joint self mob 3x15, 5s  Hip Flexor Stretch 4x30s  DL Squat 3x10 VC for posterior weight shift  Sneaky Lunge 2x10 VC for increased DF   Y-Balance Testing   SL Squat   All Units billed at Therapeutic Exercise per medicaid guidelines   Home Exercises Provided and Patient Education Provided     Education provided:   - Importance of glut and core strength     Written Home Exercises Provided: Patient instructed to cont prior HEP.  Exercises were reviewed and Irving was able to demonstrate them prior to the end of the session.  Irving demonstrated good  understanding of the education provided.     See EMR under " Patient Instructions for exercises provided prior visit.      Assessment     Patient was able to return to full activity after this day. He tolerated all LE strengthening and posterior chain stretching interventions. He will return to clinic if he has any other issues.     Irving Is progressing well towards his goals.   Pt prognosis is Excellent.     Pt will continue to benefit from skilled outpatient physical therapy to address the deficits listed in the problem list box on initial evaluation, provide pt/family education and to maximize pt's level of independence in the home and community environment.     Pt's spiritual, cultural and educational needs considered and pt agreeable to plan of care and goals.    Anticipated barriers to physical therapy: none    Goals:     ST. Patient will demonstrate a FOTO score improvement of at least 9 points prior to Week 4.  2. Patient will demonstrate HEP independence by the end of Week 4.  3. Patient will demonstrate a single limb balance test on an unable surface for greater than 30s prior to Week 4  LTG: 10+  1. Patient will demonstrate a FOTO score improvement of at least 18 points prior to discharge  2. Patient will perform ADL's for at least two days prior to discharge.       Plan     Continue with established Plan of Care towards PT goals with focus on decreasing pain, increasing ROM, strength, neuromuscular control and functional status       Jamin Pinedo, PT

## 2022-01-31 NOTE — PROGRESS NOTES
"  Physical Therapy Daily Treatment Note   D[facility] 1      Visit Date: 1/21/2022    Name: Irving Pandey  Clinic Number: [id number]    Therapy Diagnosis:   Encounter Diagnoses   Name Primary?    Decreased range of right hip movement     Impaired strength of hip muscles      Physician: Shannan Salas NP    Physician Orders: PT Eval and Treat   Medical Diagnosis from Referral: L Ankle Sprain and L Hip Pain   Evaluation Date: 1/3/2022  Authorization Period Expiration: 12/23/2022  Plan of Care Expiration: 2/15/2022  Progress Note Due: 2/15/2022  Visit # / Visits authorized: 6/ PEND   FOTO: Issued Visit # 1        Time In: 1PM  Time Out: 2PM  Total Billable Time: 30 minutes     Precautions: Standard      Subjective      Pt reports: "I'm great, going back to school soon"     he was compliant with home exercise program given last session.   Response to previous treatment:Improved hip motion into extension   Functional change: None at this time     Pain: 3/10  Location: left ankle and hip     Objective     Measurements taken:      Irving received therapeutic exercises to develop strength, endurance, ROM, flexibility and posture for 60 minutes including:  Glut Bridge SL Iso Holds 10x10s ea, VC for ipsilateral hip rotation during SL movements  Lateral/Anterior Step-downs 3x12 ea 6in   Ankle TC joint mobilization Gd 5  Soleus Stretch 4x30s  TC joint self mob 3x15, 5s  Hip Flexor Stretch 4x30s  DL Squat 3x10 VC for posterior weight shift  Sneaky Lunge 2x10 VC for increased DF   Y-Balance Testing   Hop Testing: SL Hop, SL Triple Hop, SL crossover Hop     All Units billed at Therapeutic Exercise per medicaid guidelines   Home Exercises Provided and Patient Education Provided     Education provided:   - Importance of glut and core strength     Written Home Exercises Provided: Patient instructed to cont prior HEP.  Exercises were reviewed and Irving was able to demonstrate them prior to the end of the session.  Irving demonstrated " good  understanding of the education provided.     See EMR under Patient Instructions for exercises provided prior visit.      Assessment     Patient demonstrated appropriate strength on this date. He came back in for additional education and strengthening interventions so that he may avoid this in the future. Plan to see back if needed.    Irving Is progressing well towards his goals.   Pt prognosis is Excellent.     Pt will continue to benefit from skilled outpatient physical therapy to address the deficits listed in the problem list box on initial evaluation, provide pt/family education and to maximize pt's level of independence in the home and community environment.     Pt's spiritual, cultural and educational needs considered and pt agreeable to plan of care and goals.    Anticipated barriers to physical therapy: none    Goals:     ST. Patient will demonstrate a FOTO score improvement of at least 9 points prior to Week 4.  2. Patient will demonstrate HEP independence by the end of Week 4.  3. Patient will demonstrate a single limb balance test on an unable surface for greater than 30s prior to Week 4  LTG: 10+  1. Patient will demonstrate a FOTO score improvement of at least 18 points prior to discharge  2. Patient will perform ADL's for at least two days prior to discharge.       Plan     Continue with established Plan of Care towards PT goals with focus on decreasing pain, increasing ROM, strength, neuromuscular control and functional status       Jamin Pinedo, PT

## 2024-04-05 ENCOUNTER — HOSPITAL ENCOUNTER (EMERGENCY)
Facility: HOSPITAL | Age: 24
Discharge: HOME | End: 2024-04-05
Attending: EMERGENCY MEDICINE
Payer: MEDICAID

## 2024-04-05 VITALS
WEIGHT: 180 LBS | RESPIRATION RATE: 16 BRPM | BODY MASS INDEX: 24.38 KG/M2 | DIASTOLIC BLOOD PRESSURE: 71 MMHG | TEMPERATURE: 99 F | OXYGEN SATURATION: 98 % | HEART RATE: 84 BPM | SYSTOLIC BLOOD PRESSURE: 120 MMHG | HEIGHT: 72 IN

## 2024-04-05 DIAGNOSIS — J10.1 INFLUENZA A: Primary | ICD-10-CM

## 2024-04-05 LAB
GROUP A STREP, MOLECULAR: NEGATIVE
INFLUENZA A, MOLECULAR: POSITIVE
INFLUENZA B, MOLECULAR: NEGATIVE
SARS-COV-2 RDRP RESP QL NAA+PROBE: NEGATIVE
SPECIMEN SOURCE: ABNORMAL

## 2024-04-05 PROCEDURE — 99284 EMERGENCY DEPT VISIT MOD MDM: CPT

## 2024-04-05 PROCEDURE — 87502 INFLUENZA DNA AMP PROBE: CPT

## 2024-04-05 PROCEDURE — 87651 STREP A DNA AMP PROBE: CPT

## 2024-04-05 PROCEDURE — U0002 COVID-19 LAB TEST NON-CDC: HCPCS

## 2024-04-05 RX ORDER — CETIRIZINE HYDROCHLORIDE 10 MG/1
10 TABLET ORAL DAILY
Qty: 14 TABLET | Refills: 0 | Status: SHIPPED | OUTPATIENT
Start: 2024-04-05 | End: 2024-04-05

## 2024-04-05 RX ORDER — CETIRIZINE HYDROCHLORIDE 10 MG/1
10 TABLET ORAL DAILY
Qty: 14 TABLET | Refills: 0 | Status: SHIPPED | OUTPATIENT
Start: 2024-04-05 | End: 2024-04-19

## 2024-04-05 RX ORDER — AZELASTINE 1 MG/ML
1 SPRAY, METERED NASAL 2 TIMES DAILY
Qty: 30 ML | Refills: 0 | Status: SHIPPED | OUTPATIENT
Start: 2024-04-05 | End: 2024-04-05

## 2024-04-05 RX ORDER — BENZONATATE 100 MG/1
100 CAPSULE ORAL 2 TIMES DAILY
Qty: 10 CAPSULE | Refills: 0 | Status: SHIPPED | OUTPATIENT
Start: 2024-04-05 | End: 2024-04-05

## 2024-04-05 RX ORDER — GUAIFENESIN 600 MG/1
1200 TABLET, EXTENDED RELEASE ORAL 2 TIMES DAILY
Qty: 20 TABLET | Refills: 0 | Status: SHIPPED | OUTPATIENT
Start: 2024-04-05 | End: 2024-04-10

## 2024-04-05 RX ORDER — AZELASTINE 1 MG/ML
1 SPRAY, METERED NASAL 2 TIMES DAILY
Qty: 30 ML | Refills: 0 | Status: SHIPPED | OUTPATIENT
Start: 2024-04-05 | End: 2025-04-05

## 2024-04-05 RX ORDER — GUAIFENESIN 600 MG/1
1200 TABLET, EXTENDED RELEASE ORAL 2 TIMES DAILY
Qty: 20 TABLET | Refills: 0 | Status: SHIPPED | OUTPATIENT
Start: 2024-04-05 | End: 2024-04-05

## 2024-04-05 RX ORDER — BENZONATATE 100 MG/1
100 CAPSULE ORAL 2 TIMES DAILY
Qty: 10 CAPSULE | Refills: 0 | Status: SHIPPED | OUTPATIENT
Start: 2024-04-05 | End: 2024-04-10

## 2024-04-05 NOTE — Clinical Note
"Irving Pandey ([place]) was seen and treated in our emergency department on 4/5/2024.  He may return to work on 04/08/2024.       If you have any questions or concerns, please don't hesitate to call.      Desirae Sharma PA-C"

## 2024-04-06 NOTE — DISCHARGE INSTRUCTIONS
Thank you for letting me care for you today - it was nice to meet you and I hope you feel better soon. Please return to the ER if your symptoms don't improve or get worse. And be sure to follow up with your primary care provider within the next week if not improving.     Rotate between Tylenol and ibuprofen (Motrin), switching every 3 hours. For example, Tylenol at 8am, ibuprofen at 11am, tylenol at 2pm.    Our goal at [facility] is to always give you outstanding care and exceptional service. You may receive a survey by mail or email in the next week about your experience in our ED. We would greatly appreciate you completing and returning the survey. Your feedback provides us with a way to recognize our staff who give very good care and it helps us learn how to improve when your experience was below our aspiration of excellence.     All the best,     Desirae Sharma, MPH, PA-C  Emergency Department Physician Assistant  [facility], [facility]

## 2024-04-07 NOTE — ED PROVIDER NOTES
Encounter Date: 4/5/2024       History     Chief Complaint   Patient presents with    URI     Cough, sinus congestion, abdominal, body aches, fever, runny nose, sore throat and headache x 3 days. Taking Tylenol for fever and headache with some relief.     Patient is a 23 y.o. male who presents with cough, sinus congestion, body aches, fever, runny nose, sore throat and headache x 3 days.  Patient does not reports close contacts with similar symptoms. Patient has taken tylenol for symptom relief.  Denies headache, chest pain, shortness of breath, wheezing, stridor, drooling, NVD, abdominal pain, constipation, urinary problems, joint problems, rashes, or any other complaints at this time.      The history is provided by the patient.     Review of patient's allergies indicates:  No Known Allergies  No past medical history on file.  Past Surgical History:   Procedure Laterality Date    CIRCUMCISION      WISDOM TOOTH EXTRACTION       No family history on file.  Social History     Tobacco Use    Smoking status: Never    Smokeless tobacco: Never   Substance Use Topics    Alcohol use: Never    Drug use: Never     Review of Systems   Constitutional:  Positive for fever (patient afebrile in ED). Negative for chills.   HENT:  Positive for congestion, postnasal drip, rhinorrhea and sore throat. Negative for ear discharge, ear pain, facial swelling, trouble swallowing and voice change.    Respiratory:  Positive for cough. Negative for shortness of breath and wheezing.    Cardiovascular:  Negative for chest pain.   Gastrointestinal:  Negative for abdominal distention, abdominal pain, diarrhea, nausea and vomiting.   Genitourinary:  Negative for dysuria, flank pain, frequency and hematuria.   Musculoskeletal:  Positive for myalgias. Negative for back pain, neck pain and neck stiffness.   Skin:  Negative for rash.   Neurological:  Positive for headaches. Negative for weakness.   Hematological:  Does not bruise/bleed easily.   All  other systems reviewed and are negative.      Physical Exam     Initial Vitals [04/05/24 1819]   BP Pulse Resp Temp SpO2   124/67 88 18 99.9 °F (37.7 °C) 97 %      MAP       --         Physical Exam    Vitals reviewed.  Constitutional: He appears well-developed and well-nourished.   HENT:   Head: Normocephalic and atraumatic.   Nose: Rhinorrhea present.   Mouth/Throat: Uvula is midline. No oropharyngeal exudate, posterior oropharyngeal edema or posterior oropharyngeal erythema.   2+ tonsils, bilaterally.  Tonsils symmetrical. Tonsils are not erythematous.  There is not tonsillar exudate.  No apparent PTA. Uvula midline.  There is not cervical adenopathy. No Pool's angina.     Eyes: EOM are normal. Pupils are equal, round, and reactive to light.   Neck:   Normal range of motion.  Cardiovascular:  Normal rate, regular rhythm and normal heart sounds.           Pulmonary/Chest: Breath sounds normal. No respiratory distress. He has no wheezes. He has no rhonchi. He has no rales.   Abdominal: Bowel sounds are normal. He exhibits no distension. There is no abdominal tenderness. There is no rebound.   Musculoskeletal:      Cervical back: Normal range of motion.     Lymphadenopathy:     He has no cervical adenopathy.   Neurological: He is alert and oriented to person, place, and time.         ED Course   Procedures  Labs Reviewed   INFLUENZA A & B BY MOLECULAR - Abnormal; Notable for the following components:       Result Value    Influenza A, Molecular Positive (*)     All other components within normal limits   GROUP A STREP, MOLECULAR   SARS-COV-2 RNA AMPLIFICATION, QUAL          Imaging Results    None          Medications - No data to display  Medical Decision Making  Patient is well-appearing, afebrile 23 y.o. male. VSS and do not suggest sepsis. Denies chest pain, SOB, wheezing, difficulty swallowing, neck pain, neck stiffness. Lung sounds are clear and not consistent with pneumonia. There is no neck pain or limited  ROM to suggest retropharyngeal abscess or meningitis. Tolerating PO, non-toxic appearing, no hypoxia. The patient remained comfortable and stable during their visit in the ED.  Details of ED course documented in ED workup.     Differential Diagnosis includes, but is not limited to: COVID, influenza, viral URI, bacterial/viral pharyngitis, pneumonia, PTA, sinusitis    All historical, clinical, and laboratory findings reviewed. COVID test negative. Influenza test positive. Strep test negative.Patient with constellation of symptoms most consistent with influenza. There are no concerning features on physical exam to suggest an emergent or life threatening condition or an invasive bacterial infection, including, but not limited to: bacterial otitis media/externa, sinusitis, pharyngitis, or peritonsillar abscess. No further intervention is indicated at this time. The patient is at low risk for an emergent/life threatening medical condition at this time, and I am of the belief that that it is safe to discharge the patient from the emergency department.     Patient instructed to follow up with PCP in 2-3 days for recheck of today's complaints. Patient has been counseled regarding the need for follow-up as well as the indications to return to the emergency room should new or worrisome developments. Discharge and follow-up instructions discussed with the patient who expressed understanding and willingness to comply with recommendations. Patient discharged from the emergency department in stable condition, in no acute distress.     Amount and/or Complexity of Data Reviewed  Labs: ordered. Decision-making details documented in ED Course.    Risk  OTC drugs.  Prescription drug management.               ED Course as of 04/07/24 1254   Fri Apr 05, 2024 1937 SARS-CoV-2 RNA, Amplification, Qual: Negative [OB]   1937 Group A Strep, Molecular: Negative [OB]   1937 Influenza A, Molecular(!): Positive [OB]   1937 Influenza B,  Molecular: Negative [OB]      ED Course User Index  [OB] Desirae Sharma PA-C                           Clinical Impression:  Final diagnoses:  [J10.1] Influenza A (Primary)          ED Disposition Condition    Discharge Stable          ED Prescriptions       Medication Sig Dispense Start Date End Date Auth. Provider    cetirizine (ZYRTEC) 10 MG tablet  (Status: Discontinued) Take 1 tablet (10 mg total) by mouth once daily. for 14 days 14 tablet 4/5/2024 4/5/2024 Desirae Sharma PA-C    benzonatate (TESSALON) 100 MG capsule  (Status: Discontinued) Take 1 capsule (100 mg total) by mouth 2 (two) times a day. for 5 days 10 capsule 4/5/2024 4/5/2024 Desirae Sharma PA-C    guaiFENesin (MUCINEX) 600 mg 12 hr tablet  (Status: Discontinued) Take 2 tablets (1,200 mg total) by mouth 2 (two) times daily. for 5 days 20 tablet 4/5/2024 4/5/2024 Desirae Sharma PA-C    azelastine (ASTELIN) 137 mcg (0.1 %) nasal spray  (Status: Discontinued) 1 spray (137 mcg total) by Nasal route 2 (two) times daily. 30 mL 4/5/2024 4/5/2024 Desirae Sharma PA-C    azelastine (ASTELIN) 137 mcg (0.1 %) nasal spray 1 spray (137 mcg total) by Nasal route 2 (two) times daily. 30 mL 4/5/2024 4/5/2025 Desirae Sharma PA-C    benzonatate (TESSALON) 100 MG capsule Take 1 capsule (100 mg total) by mouth 2 (two) times a day. for 5 days 10 capsule 4/5/2024 4/10/2024 Desirae Sharma PA-C    cetirizine (ZYRTEC) 10 MG tablet Take 1 tablet (10 mg total) by mouth once daily. for 14 days 14 tablet 4/5/2024 4/19/2024 Desirae Sharma PA-C    guaiFENesin (MUCINEX) 600 mg 12 hr tablet Take 2 tablets (1,200 mg total) by mouth 2 (two) times daily. for 5 days 20 tablet 4/5/2024 4/10/2024 Desirae Sharma PA-C          Follow-up Information       Follow up With Specialties Details Why Contact Info    Marianna Gunderson MD Pediatrics   [address] [facility] [place] [address]  [phone]               Desirae Sharma PA-C  04/07/24 4537